# Patient Record
Sex: FEMALE | Race: WHITE | NOT HISPANIC OR LATINO
[De-identification: names, ages, dates, MRNs, and addresses within clinical notes are randomized per-mention and may not be internally consistent; named-entity substitution may affect disease eponyms.]

---

## 2021-07-15 ENCOUNTER — NON-APPOINTMENT (OUTPATIENT)
Age: 81
End: 2021-07-15

## 2021-07-15 ENCOUNTER — APPOINTMENT (OUTPATIENT)
Dept: OTOLARYNGOLOGY | Facility: CLINIC | Age: 81
End: 2021-07-15
Payer: MEDICARE

## 2021-07-15 VITALS
HEIGHT: 62 IN | OXYGEN SATURATION: 99 % | BODY MASS INDEX: 20.24 KG/M2 | TEMPERATURE: 97.8 F | SYSTOLIC BLOOD PRESSURE: 165 MMHG | DIASTOLIC BLOOD PRESSURE: 71 MMHG | HEART RATE: 51 BPM | WEIGHT: 110 LBS | RESPIRATION RATE: 14 BRPM

## 2021-07-15 DIAGNOSIS — Z87.891 PERSONAL HISTORY OF NICOTINE DEPENDENCE: ICD-10-CM

## 2021-07-15 DIAGNOSIS — Z82.3 FAMILY HISTORY OF STROKE: ICD-10-CM

## 2021-07-15 DIAGNOSIS — Z82.49 FAMILY HISTORY OF ISCHEMIC HEART DISEASE AND OTHER DISEASES OF THE CIRCULATORY SYSTEM: ICD-10-CM

## 2021-07-15 PROBLEM — Z00.00 ENCOUNTER FOR PREVENTIVE HEALTH EXAMINATION: Status: ACTIVE | Noted: 2021-07-15

## 2021-07-15 PROCEDURE — 69210 REMOVE IMPACTED EAR WAX UNI: CPT

## 2021-07-15 PROCEDURE — 99204 OFFICE O/P NEW MOD 45 MIN: CPT | Mod: 25

## 2021-07-15 RX ORDER — ESCITALOPRAM OXALATE 5 MG/1
TABLET, FILM COATED ORAL
Refills: 0 | Status: ACTIVE | COMMUNITY

## 2021-07-15 RX ORDER — FAMOTIDINE 40 MG/1
40 TABLET, FILM COATED ORAL
Refills: 0 | Status: ACTIVE | COMMUNITY

## 2021-07-15 RX ORDER — BUPRENORPHINE HYDROCHLORIDE 8 MG/1
TABLET SUBLINGUAL
Refills: 0 | Status: ACTIVE | COMMUNITY

## 2021-07-15 RX ORDER — LEVOTHYROXINE SODIUM 137 UG/1
TABLET ORAL
Refills: 0 | Status: ACTIVE | COMMUNITY

## 2021-07-15 RX ORDER — ATORVASTATIN CALCIUM 20 MG/1
20 TABLET, FILM COATED ORAL
Refills: 0 | Status: ACTIVE | COMMUNITY

## 2021-07-15 NOTE — HISTORY OF PRESENT ILLNESS
[de-identified] : 81F, referred by Dr. 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 She reports her dizziness has worsened in the last two weeks. She denies any otalgia, otorrhea. No other ENT complaints. No pertinent Fh/SH. Needs to use a walker. She does no t want me to communicate with her surgeons.

## 2021-07-15 NOTE — PHYSICAL EXAM
[Normal] : no rashes [de-identified] : R EAC with cerumen impaction, removed with curet [de-identified] : gait steady

## 2021-07-15 NOTE — PROCEDURE
[Cerumen Impaction] : Cerumen Impaction [Same] : same as the Pre Op Dx. [] : Removal of Cerumen [FreeTextEntry6] : r copious cerumen impaction removed atraumatically with curet

## 2021-07-15 NOTE — ASSESSMENT
[FreeTextEntry1] : 81F who presents for second opinion of dizziness and imbalance, treated by outside physician for L SCCD with round window plugging, followed by mcf approach repair and removal of encephalocele. \par cerumen rmeoved\par \par Plan:\par - VNG\par - VEMP\par - CT temp\par - MRI IAC\par - f/u with results

## 2021-07-16 ENCOUNTER — NON-APPOINTMENT (OUTPATIENT)
Age: 81
End: 2021-07-16

## 2021-07-29 ENCOUNTER — TRANSCRIPTION ENCOUNTER (OUTPATIENT)
Age: 81
End: 2021-07-29

## 2021-08-03 ENCOUNTER — APPOINTMENT (OUTPATIENT)
Dept: OTOLARYNGOLOGY | Facility: CLINIC | Age: 81
End: 2021-08-03
Payer: MEDICARE

## 2021-08-03 VITALS
DIASTOLIC BLOOD PRESSURE: 60 MMHG | HEART RATE: 62 BPM | SYSTOLIC BLOOD PRESSURE: 99 MMHG | OXYGEN SATURATION: 97 % | TEMPERATURE: 98.2 F

## 2021-08-03 DIAGNOSIS — H83.8X2 OTHER SPECIFIED DISEASES OF LEFT INNER EAR: ICD-10-CM

## 2021-08-03 PROCEDURE — 92517 VEMP TEST I&R CERVICAL: CPT

## 2021-08-03 PROCEDURE — 92537 CALORIC VSTBLR TEST W/REC: CPT

## 2021-08-03 PROCEDURE — 99214 OFFICE O/P EST MOD 30 MIN: CPT

## 2021-08-03 PROCEDURE — 92540 BASIC VESTIBULAR EVALUATION: CPT

## 2021-08-03 NOTE — HISTORY OF PRESENT ILLNESS
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 She needs tu use a rollator. She had vng, vemp and mri and is here to review results. She has restarted v rehab at [facility]. She saw Dr Tenorio, her surgeon, she reported, who told her to get more v rehab.

## 2021-08-03 NOTE — DATA REVIEWED
[de-identified] : vng r abnormality as well as l with reversal of nystagmus and unexpected direction, vertical nystagmus. 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

## 2021-08-03 NOTE — ASSESSMENT
[FreeTextEntry1] : disequilibrium likely because there was issue in r inner ear and she is now more reliant on it\par all I can recommend is more v rehab; cannot ablate remainder of l side as there is abnormal r fn and could make her disequilibrium worse \par explained to pt\par blood tests \par will call when blood test results are reported\par followup with ct as well\par see dr contreras for large arachnoid cyst\par

## 2021-08-04 ENCOUNTER — NON-APPOINTMENT (OUTPATIENT)
Age: 81
End: 2021-08-04

## 2021-08-04 LAB
ALBUMIN SERPL ELPH-MCNC: 4.8 G/DL
ALP BLD-CCNC: 78 U/L
ALT SERPL-CCNC: 13 U/L
ANION GAP SERPL CALC-SCNC: 14 MMOL/L
AST SERPL-CCNC: 19 U/L
B BURGDOR AB SER-IMP: NEGATIVE
B BURGDOR IGG+IGM SER QL: 0.11 INDEX
BASOPHILS # BLD AUTO: 0.04 K/UL
BASOPHILS NFR BLD AUTO: 0.6 %
BILIRUB SERPL-MCNC: 0.3 MG/DL
BUN SERPL-MCNC: 17 MG/DL
CALCIUM SERPL-MCNC: 9.2 MG/DL
CHLORIDE SERPL-SCNC: 99 MMOL/L
CO2 SERPL-SCNC: 27 MMOL/L
CREAT SERPL-MCNC: 0.71 MG/DL
DSDNA AB SER-ACNC: <12 IU/ML
EOSINOPHIL # BLD AUTO: 0.1 K/UL
EOSINOPHIL NFR BLD AUTO: 1.5 %
ERYTHROCYTE [SEDIMENTATION RATE] IN BLOOD BY WESTERGREN METHOD: 20 MM/HR
GLUCOSE SERPL-MCNC: 83 MG/DL
HCT VFR BLD CALC: 43.3 %
HGB BLD-MCNC: 13.8 G/DL
IMM GRANULOCYTES NFR BLD AUTO: 0.3 %
LYMPHOCYTES # BLD AUTO: 1.48 K/UL
LYMPHOCYTES NFR BLD AUTO: 22.8 %
MAN DIFF?: NORMAL
MCHC RBC-ENTMCNC: 30.3 PG
MCHC RBC-ENTMCNC: 31.9 GM/DL
MCV RBC AUTO: 95.2 FL
MONOCYTES # BLD AUTO: 0.48 K/UL
MONOCYTES NFR BLD AUTO: 7.4 %
NEUTROPHILS # BLD AUTO: 4.37 K/UL
NEUTROPHILS NFR BLD AUTO: 67.4 %
PLATELET # BLD AUTO: 242 K/UL
POTASSIUM SERPL-SCNC: 4.7 MMOL/L
PROT SERPL-MCNC: 7 G/DL
RBC # BLD: 4.55 M/UL
RBC # FLD: 14.2 %
RHEUMATOID FACT SER QL: 10 IU/ML
SODIUM SERPL-SCNC: 140 MMOL/L
T PALLIDUM AB SER QL IA: NEGATIVE
WBC # FLD AUTO: 6.49 K/UL

## 2021-08-05 ENCOUNTER — APPOINTMENT (OUTPATIENT)
Dept: OTOLARYNGOLOGY | Facility: CLINIC | Age: 81
End: 2021-08-05

## 2021-08-06 LAB — ANA SER IF-ACNC: NEGATIVE

## 2021-08-09 ENCOUNTER — NON-APPOINTMENT (OUTPATIENT)
Age: 81
End: 2021-08-09

## 2021-08-09 LAB — INNER EAR 68KD AB FLD QL: NEGATIVE

## 2021-08-20 ENCOUNTER — APPOINTMENT (OUTPATIENT)
Dept: NEUROSURGERY | Facility: CLINIC | Age: 81
End: 2021-08-20
Payer: MEDICARE

## 2021-08-20 DIAGNOSIS — G93.0 CEREBRAL CYSTS: ICD-10-CM

## 2021-08-20 PROCEDURE — 99443: CPT | Mod: 95

## 2021-08-20 NOTE — ASSESSMENT
[FreeTextEntry1] : My impression is that the patient suffers from mutliple incidental lesions found on MRI .   The patient’s established problem of dizziness  is unrelated to her pineal cyst and her arachnoid cyst.   I had a long discussion with the patient regarding the role of serial imaging with short term imaging.  The patient was extensively educated about the nature of her disease process. Therapeutic and diagnostic tests include mri brain with and without contrast in Spring 2022.  The patient should continue to see Dr. Prakash.  I will see the patient back in Spring 2022. I have explained the alternatives, risks and benefits to the patient and she understands and agrees to proceed.  This risk of the procedure including but not limited to pain, infection, seizure, stroke, recurrence, residual disease, neurovascular injury, heart attack, pulmonary embolism, blindness, weakness, paralysis and death have been carefully explained to the patient who clearly understands and agrees to proceed.\par

## 2021-08-20 NOTE — HISTORY OF PRESENT ILLNESS
[de-identified] : 81 year old female who initially underwent left round window plugging for  superior semicircular canal dehiscence syndrome (SSCD):, then presented to Dr. Prakash for second opinion for worsening dizziness . \par She underwent VNG and MRI and was recommended for further vestiblar rehab.\par Found to have incidental arachnoid cyst for which she presents today.

## 2021-08-20 NOTE — DATA REVIEWED
[de-identified] : I have reviewed the most recent MRI  8/2/21 at [facility] which shows an incidental 4mm pineal cyst and incidental 3.7X2.8X4.2 cm left parafalcine arachnoid cyst

## 2021-10-18 ENCOUNTER — NON-APPOINTMENT (OUTPATIENT)
Age: 81
End: 2021-10-18

## 2021-11-03 ENCOUNTER — APPOINTMENT (OUTPATIENT)
Dept: OTOLARYNGOLOGY | Facility: CLINIC | Age: 81
End: 2021-11-03
Payer: MEDICARE

## 2021-11-03 VITALS
HEART RATE: 66 BPM | OXYGEN SATURATION: 96 % | DIASTOLIC BLOOD PRESSURE: 71 MMHG | TEMPERATURE: 98.1 F | SYSTOLIC BLOOD PRESSURE: 128 MMHG

## 2021-11-03 DIAGNOSIS — R42 DIZZINESS AND GIDDINESS: ICD-10-CM

## 2021-11-03 PROCEDURE — 99212 OFFICE O/P EST SF 10 MIN: CPT

## 2021-11-03 NOTE — ASSESSMENT
[FreeTextEntry1] : she asked if I thought she could be a candidate for vestibular implant\par I recommended she send her records to [facility] and ask - to see if they will do a telehealth visit o.w to go in for appt\par she agreed

## 2021-11-03 NOTE — HISTORY OF PRESENT ILLNESS
[de-identified] : followup 80 yo woman s/p l sscd repair by another physician and unusual b vestibular responses came in to ask whether a vestibular implant could be useful for her. She feels her sx are worse. She uses a rollator and has tried vestibular rehab unsuccessfully.

## 2022-04-18 ENCOUNTER — NON-APPOINTMENT (OUTPATIENT)
Age: 82
End: 2022-04-18

## 2022-10-25 ENCOUNTER — NON-APPOINTMENT (OUTPATIENT)
Age: 82
End: 2022-10-25

## 2023-03-03 ENCOUNTER — APPOINTMENT (OUTPATIENT)
Dept: OTOLARYNGOLOGY | Facility: CLINIC | Age: 83
End: 2023-03-03
Payer: MEDICARE

## 2023-03-03 ENCOUNTER — NON-APPOINTMENT (OUTPATIENT)
Age: 83
End: 2023-03-03

## 2023-03-03 VITALS
OXYGEN SATURATION: 96 % | RESPIRATION RATE: 16 BRPM | HEIGHT: 62 IN | WEIGHT: 110 LBS | BODY MASS INDEX: 20.24 KG/M2 | DIASTOLIC BLOOD PRESSURE: 71 MMHG | SYSTOLIC BLOOD PRESSURE: 146 MMHG | TEMPERATURE: 97.4 F | HEART RATE: 56 BPM

## 2023-03-03 DIAGNOSIS — H92.03 OTALGIA, BILATERAL: ICD-10-CM

## 2023-03-03 DIAGNOSIS — R68.89 OTHER GENERAL SYMPTOMS AND SIGNS: ICD-10-CM

## 2023-03-03 PROCEDURE — 99213 OFFICE O/P EST LOW 20 MIN: CPT | Mod: 25

## 2023-03-03 PROCEDURE — G0268 REMOVAL OF IMPACTED WAX MD: CPT

## 2023-03-03 NOTE — HISTORY OF PRESENT ILLNESS
[de-identified] : Followup visit for this 81 y/o F last seen in November 2021 c/o b otalgia and tmj. She just stopped v rehab and I recommended more as she is still c/o disequilibrium. She has h/o l sccd repaired by another physician. She wished to have her ears checked.

## 2023-03-03 NOTE — PROCEDURE
[Cerumen Impaction] : Cerumen Impaction [Same] : same as the Pre Op Dx. [] : Removal of Cerumen [FreeTextEntry5] : l nl, r copious cerumen removed atraumatically with suction, b TMs nl

## 2023-03-03 NOTE — PHYSICAL EXAM
[de-identified] : b TMJ l>r [de-identified] : l nl, r copious cerumen removed atraumatically with suction, b TMs nl  [Normal] : no nystagmus [de-identified] : pt ambulates with walker

## 2023-03-03 NOTE — ASSESSMENT
[FreeTextEntry1] : 1. vertigo\par -continue vestibular therapy\par -per her request, VNG ordered so she can see if her function has changed\par 2. r cerumen impaction \par -cerumen removed\par -ears felt better\par 3. TMJ\par -recommended followup with Dr. Blackwood, name and contact information provided \par RTC with VNG to review findings

## 2023-03-20 ENCOUNTER — APPOINTMENT (OUTPATIENT)
Dept: OTOLARYNGOLOGY | Facility: CLINIC | Age: 83
End: 2023-03-20
Payer: MEDICARE

## 2023-03-20 VITALS
DIASTOLIC BLOOD PRESSURE: 81 MMHG | BODY MASS INDEX: 20.43 KG/M2 | HEIGHT: 62 IN | OXYGEN SATURATION: 96 % | HEART RATE: 61 BPM | TEMPERATURE: 98 F | WEIGHT: 111 LBS | SYSTOLIC BLOOD PRESSURE: 165 MMHG

## 2023-03-20 PROCEDURE — 92540 BASIC VESTIBULAR EVALUATION: CPT

## 2023-03-20 PROCEDURE — 99213 OFFICE O/P EST LOW 20 MIN: CPT

## 2023-03-20 PROCEDURE — 92537 CALORIC VSTBLR TEST W/REC: CPT

## 2023-03-20 NOTE — HISTORY OF PRESENT ILLNESS
[de-identified] : 17 day followup visit for this 83 y/o F with disequilibrium. She is here to review VNG. She has h/o l sccd repaired by another physician in the past. She feels her disequilibrium has worsened.. She had had an MI while at  rehab.

## 2023-03-20 NOTE — ASSESSMENT
[FreeTextEntry1] : disequilibrium - persistent but feels more\par more v rehab recommended - she said she would do it herself\par rtc 2 mo to monitor progess.

## 2023-12-27 ENCOUNTER — APPOINTMENT (OUTPATIENT)
Dept: OTOLARYNGOLOGY | Facility: CLINIC | Age: 83
End: 2023-12-27
Payer: MEDICARE

## 2023-12-27 VITALS
BODY MASS INDEX: 20.43 KG/M2 | DIASTOLIC BLOOD PRESSURE: 77 MMHG | SYSTOLIC BLOOD PRESSURE: 150 MMHG | OXYGEN SATURATION: 97 % | WEIGHT: 111 LBS | HEIGHT: 62 IN | TEMPERATURE: 98 F | HEART RATE: 60 BPM

## 2023-12-27 DIAGNOSIS — H91.90 UNSPECIFIED HEARING LOSS, UNSPECIFIED EAR: ICD-10-CM

## 2023-12-27 DIAGNOSIS — H61.21 IMPACTED CERUMEN, RIGHT EAR: ICD-10-CM

## 2023-12-27 DIAGNOSIS — R42 DIZZINESS AND GIDDINESS: ICD-10-CM

## 2023-12-27 PROCEDURE — 99213 OFFICE O/P EST LOW 20 MIN: CPT | Mod: 25

## 2023-12-27 PROCEDURE — 69210 REMOVE IMPACTED EAR WAX UNI: CPT

## 2023-12-27 NOTE — DATA REVIEWED
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

## 2023-12-27 NOTE — HISTORY OF PRESENT ILLNESS
[de-identified] : 9 month followup visit for this 82 y/o F with disequilibrium. She has h/o l sccd repaired by another physician in the past. She has done vestibular therapy in the past due to persistence of sx. She had ablation of cervical nerve by pain management and feels dizziness has worsened. She can hear her voice in her ear. She also feels her hearing is gradually worse.

## 2023-12-27 NOTE — PHYSICAL EXAM
[de-identified] : r copious cerumen removed atraumatically with suction, b TMs nl, l nl [Normal] : no nystagmus [de-identified] : pt ambulates with a walker

## 2023-12-27 NOTE — ASSESSMENT
[FreeTextEntry1] : 1. 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 r cerumen impaction  -cerumen removed -ear felt better -avoid qtip usage 3. concern for hearing loss -recommended - she did not wish to have this today RTC with MRI, VNG, MRA head/ neck, MRI cspine,  to review findings; call sooner for any issues

## 2023-12-27 NOTE — PROCEDURE
[Cerumen Impaction] : Cerumen Impaction [Same] : same as the Pre Op Dx. [] : Removal of Cerumen [FreeTextEntry5] : r copious cerumen removed atraumatically with suction, b TMs nl, l nl

## 2024-01-23 ENCOUNTER — APPOINTMENT (OUTPATIENT)
Dept: OTOLARYNGOLOGY | Facility: CLINIC | Age: 84
End: 2024-01-23
Payer: MEDICARE

## 2024-01-23 VITALS
WEIGHT: 111 LBS | HEART RATE: 62 BPM | DIASTOLIC BLOOD PRESSURE: 77 MMHG | BODY MASS INDEX: 20.43 KG/M2 | OXYGEN SATURATION: 98 % | HEIGHT: 62 IN | SYSTOLIC BLOOD PRESSURE: 148 MMHG | TEMPERATURE: 98 F

## 2024-01-23 DIAGNOSIS — G45.0 VERTEBRO-BASILAR ARTERY SYNDROME: ICD-10-CM

## 2024-01-23 PROCEDURE — 92537 CALORIC VSTBLR TEST W/REC: CPT

## 2024-01-23 PROCEDURE — 92540 BASIC VESTIBULAR EVALUATION: CPT

## 2024-01-23 PROCEDURE — 99213 OFFICE O/P EST LOW 20 MIN: CPT

## 2024-01-23 NOTE — HISTORY OF PRESENT ILLNESS
[de-identified] : 1 mo follow up appt fot  this 84 yo f with increased disequilibrium - she had ng mri and mra and is here to review results. She feels similar to last visit.

## 2024-01-23 NOTE — ASSESSMENT
[FreeTextEntry1] : l vertebral artery stenosis/occlusion - severe - explained to pt discussed with Dr Crowell - he recommended asa (she takes it) and expalined his office staff would call to reach her to  set up appointment in the near future. I asked her to call me for any issues.

## 2024-01-23 NOTE — DATA REVIEWED
[de-identified] : vng no change reviewed with pt [de-identified] : mri, mra brain and neck - n tunmor or major cva but l vertebral artery occlusion severe reviewed with pt

## 2024-01-23 NOTE — PHYSICAL EXAM
[Normal] : assessment of respiratory effort is normal [FreeTextEntry2] : VII intact  [de-identified] : uses wheelchair/rollator

## 2024-01-26 ENCOUNTER — APPOINTMENT (OUTPATIENT)
Dept: NEUROSURGERY | Facility: CLINIC | Age: 84
End: 2024-01-26
Payer: MEDICARE

## 2024-01-26 VITALS
HEIGHT: 62 IN | HEART RATE: 71 BPM | WEIGHT: 111 LBS | BODY MASS INDEX: 20.43 KG/M2 | RESPIRATION RATE: 18 BRPM | OXYGEN SATURATION: 97 % | SYSTOLIC BLOOD PRESSURE: 111 MMHG | TEMPERATURE: 97.7 F | DIASTOLIC BLOOD PRESSURE: 68 MMHG

## 2024-01-26 DIAGNOSIS — R26.89 OTHER ABNORMALITIES OF GAIT AND MOBILITY: ICD-10-CM

## 2024-01-26 DIAGNOSIS — Z85.850 PERSONAL HISTORY OF MALIGNANT NEOPLASM OF THYROID: ICD-10-CM

## 2024-01-26 DIAGNOSIS — Z87.11 PERSONAL HISTORY OF PEPTIC ULCER DISEASE: ICD-10-CM

## 2024-01-26 DIAGNOSIS — Z87.19 PERSONAL HISTORY OF OTHER DISEASES OF THE DIGESTIVE SYSTEM: ICD-10-CM

## 2024-01-26 DIAGNOSIS — R29.2 ABNORMAL REFLEX: ICD-10-CM

## 2024-01-26 DIAGNOSIS — Z87.09 PERSONAL HISTORY OF OTHER DISEASES OF THE RESPIRATORY SYSTEM: ICD-10-CM

## 2024-01-26 DIAGNOSIS — Z86.39 PERSONAL HISTORY OF OTHER ENDOCRINE, NUTRITIONAL AND METABOLIC DISEASE: ICD-10-CM

## 2024-01-26 DIAGNOSIS — R26.9 UNSPECIFIED ABNORMALITIES OF GAIT AND MOBILITY: ICD-10-CM

## 2024-01-26 DIAGNOSIS — Z85.3 PERSONAL HISTORY OF MALIGNANT NEOPLASM OF BREAST: ICD-10-CM

## 2024-01-26 PROCEDURE — 99203 OFFICE O/P NEW LOW 30 MIN: CPT

## 2024-01-26 RX ORDER — FLUOROURACIL 50 MG/G
5 CREAM TOPICAL
Qty: 40 | Refills: 0 | Status: ACTIVE | COMMUNITY
Start: 2023-12-05

## 2024-02-23 ENCOUNTER — NON-APPOINTMENT (OUTPATIENT)
Age: 84
End: 2024-02-23

## 2024-02-27 NOTE — ADDENDUM
[FreeTextEntry1] : Patient Name: ALEJANDRO COLE   Subjective:   (1) Chief Complaint: Dizziness and loss of balance   (2) HPI: Patient reports having used a walker for years due to progressing imbalance. Patient also has headaches. Complaints of being always lightheaded and having brain fog. Patient also mentions thyroid problems and a removal of thyroid 10 years ago   (3) History: Medical History includes thyroid disease, eye removal, breast removal, hip replacement, and craniotomy. Surgical history includes 26 surgeries including thyroid removal, eye removal, and hip replacement.   (4) Review of Systems: Patient reports that apart from the headaches and dizziness, she is also experiencing a low mood (potential symptom of a mental health issue). Patient also reports constant instability but no documented falls.   (5) Current Medications: Atorvastatin, Lipitor, Lexapro, Buprenorphine, and Thyroxine.   (6) Allergies: Codeine, Ciprofloxacin, Scopolamine   Objective:   (1) Vital Signs: No objective vital signs mentioned   (2) Physical Exam Findings: Upon examination, found that the patient's reflexes are a bit brisk. There doesn't appear to be any weakness in the arms.   (3) Laboratory data: No specific laboratory data mentioned   (4) Imaging Results: An MRI of the patient's thoracic and lumbar spine is suggested. An NMRA-NOVA test for the blood flow of arteries is also suggested.   (5) Other Diagnostic Data: No other diagnostics data mentioned   (6) Other Clinicians: Dr. Prakash and a neurologist have been mentioned.   Assessment:   (1) Problems:      (a) Dizziness      (b) Headaches      (c) Loss of balance      (d) Thyroid problems      (e) History of multiple surgeries   (2) Differential Diagnosis:      (a) Thyroid function related issues causing dizziness      (b) Motor degenerative disease      (c) Myelopathy (spinal cord syndrome)      (d) Vascular abnormalities   Plan:   (1) Item:      (a) Check thyroid level with internist or endocrinologist      (b) Carry out NMRA-NOVA test      (c) Carry out an MRI of thoracic and lumbar spine      (d) Patient to consult with neurologist Dr. Jack Christie   (2) Summary: Plan to confirm levels of thyroid, conduct NMRA-NOVA and MRI tests, and refer the patient to a neurologist. Patient Name: ALEJANDRO COLE   Subjective:   (1) Chief Complaint: Patient's history of cyst in the brain and ratty arteries.   (2) HPI:       (a) Onset: The patient confirmed having the cyst since childhood.       (b) Characterization: The cyst is non-enhancing indicating no issues. Patient has ratty, narrow arteries which might be causing reduced blood flow.       (c) Alleviating and Aggravating factors: Not mentioned       (d) Radiation: Not mentioned       (e) Temporal factor: Chronic       (f) Severity: Not causing any symptoms or problems.   (3) History:       (a) Medical history: Cyst in the brain, ratty arteries       (b) Surgical history: Not mentioned       (c) Family history: Not mentioned       (d) Social History: Not mentioned   (4) Review of Systems: The patient has no symptoms or problems despite the cyst and ratty arteries.   (5) Current Medications: Recommendations for aspirin and statin   (6) Allergies: Not mentioned   Objective:   (1) Vital Signs: Not mentioned   (2) Physical Exam Findings: Upon review of the patient's scan, Provider found the patient's cyst is age-old and non-enhancing. Patient's arteries, particularly left internal carotid artery, were found to be narrow and ratty. The Provider also noted an anomaly in the anterior cerebral arteries, but concluded it's a normal anomaly for such arteries to exist.   (3) Laboratory data: Not mentioned   (4) Imaging Results: Brain scan shows an old, non-enhancing cyst and narrowed, ratty arteries. 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   (5) Other Diagnostic Data: Not mentioned   (6) Other Clinicians: Dr. High   Assessment:   (1) Problems:      (a) Cyst in the brain      (b) Narrow and 'ratty' arteries   (2) Differential Diagnosis:      (a) Chronic disease of the arteries, likely present since birth.      (b) Asymptomatic cyst in the brain.   Plan:   (1) Item:      (a) Perform NOVA test to check blood flow in each vessel      (b) Check blood flows in the middle cerebral artery on both sides      (c) Commence medical therapy as there's not much that can done about the state of the arteries.      (d) Commence aspirin and statin treatment   (2) Summary: The primary plan is to perform a NOVA test to investigate the blood flow in each vessel and compare the blood flows in the middle cerebral artery on both sides. The patient should also commence medical therapy which includes aspirin and statin treatment, given the state of the arteries. The cyst is not a cause for concern as it is asymptomatic.

## 2024-02-27 NOTE — ASSESSMENT
[FreeTextEntry1] : I, Dr. Crowell, personally performed the evaluation and management (E/M) services for this new patient who presents today with vertigo and imaging review. That E/M includes conducting the examination, assessing all new/exacerbated conditions, and establishing a new plan of care. Today, my ACP, Cecille Hicks, was here to observe my evaluation and management services for this new problem/exacerbated condition to be followed going forward.  PLAN: MRA NOVA to determine cerebral bloodflow through arteries that appear diseased on MRA MRI of thoracic and lumbar  remain io asa and statin f/u Dr. Kashif Christie Have TSH checked

## 2024-02-27 NOTE — PHYSICAL EXAM
[Oriented To Time, Place, And Person] : oriented to person, place, and time [Person] : oriented to person [Place] : oriented to place [Time] : oriented to time [Short Term Intact] : short term memory intact [Remote Intact] : remote memory intact [Registration Intact] : recent registration memory intact [Span Intact] : the attention span was normal [Concentration Intact] : normal concentrating ability [Fluency] : fluency intact [Current Events] : adequate knowledge of current events [Comprehension] : comprehension intact [Past History] : adequate knowledge of personal past history [Vocabulary] : adequate range of vocabulary [Involuntary Movements] : no involuntary movements were seen [Motor Strength] : muscle strength was normal in all four extremities [No Muscle Atrophy] : normal bulk in all four extremities [5] : T1 abductor digiti minimi 5/5 [4] : S1 toe walking 4/5 [Limited Balance] : the patient's balance was impaired [Coordination - Dysmetria Impaired Heel-to-Shin Bilateral] : present bilaterally [3+] : Ankle jerk left 3+ [___] : absent on the right [Tremor] : no tremor present [___] : absent on the left [FreeTextEntry9] : + demarco nieves

## 2024-02-29 ENCOUNTER — APPOINTMENT (OUTPATIENT)
Dept: NEUROSURGERY | Facility: CLINIC | Age: 84
End: 2024-02-29